# Patient Record
Sex: MALE | Race: BLACK OR AFRICAN AMERICAN | Employment: UNEMPLOYED | URBAN - METROPOLITAN AREA
[De-identification: names, ages, dates, MRNs, and addresses within clinical notes are randomized per-mention and may not be internally consistent; named-entity substitution may affect disease eponyms.]

---

## 2022-07-07 ENCOUNTER — HOSPITAL ENCOUNTER (EMERGENCY)
Age: 22
Discharge: HOME | End: 2022-07-07
Attending: EMERGENCY MEDICINE

## 2022-07-07 VITALS
DIASTOLIC BLOOD PRESSURE: 74 MMHG | TEMPERATURE: 97.5 F | HEART RATE: 61 BPM | HEIGHT: 71 IN | OXYGEN SATURATION: 100 % | WEIGHT: 169 LBS | BODY MASS INDEX: 23.66 KG/M2 | SYSTOLIC BLOOD PRESSURE: 141 MMHG | RESPIRATION RATE: 18 BRPM

## 2022-07-07 DIAGNOSIS — Z20.2 POSSIBLE EXPOSURE TO STD: Primary | ICD-10-CM

## 2022-07-07 DIAGNOSIS — N50.89 GENITAL LESION, MALE: ICD-10-CM

## 2022-07-07 LAB
APPEARANCE UR: CLEAR
BILIRUB UR QL: NEGATIVE
COLOR UR: YELLOW
GLUCOSE UR STRIP.AUTO-MCNC: NEGATIVE MG/DL
HGB UR QL STRIP: NEGATIVE
KETONES UR QL STRIP.AUTO: NEGATIVE MG/DL
LEUKOCYTE ESTERASE UR QL STRIP.AUTO: NEGATIVE
NITRITE UR QL STRIP.AUTO: NEGATIVE
PH UR STRIP: 7.5 [PH] (ref 5–8)
PROT UR STRIP-MCNC: NEGATIVE MG/DL
SP GR UR REFRACTOMETRY: 1.01 (ref 1–1.03)
UROBILINOGEN UR QL STRIP.AUTO: 0.2 EU/DL (ref 0.2–1)

## 2022-07-07 PROCEDURE — 87661 TRICHOMONAS VAGINALIS AMPLIF: CPT

## 2022-07-07 PROCEDURE — 87255 GENET VIRUS ISOLATE HSV: CPT

## 2022-07-07 PROCEDURE — 86780 TREPONEMA PALLIDUM: CPT

## 2022-07-07 PROCEDURE — 81003 URINALYSIS AUTO W/O SCOPE: CPT

## 2022-07-07 PROCEDURE — 87491 CHLMYD TRACH DNA AMP PROBE: CPT

## 2022-07-07 PROCEDURE — 99283 EMERGENCY DEPT VISIT LOW MDM: CPT

## 2022-07-07 RX ORDER — ACYCLOVIR 400 MG/1
400 TABLET ORAL
Qty: 35 TABLET | Refills: 0 | Status: SHIPPED | OUTPATIENT
Start: 2022-07-07 | End: 2022-07-14

## 2022-07-07 NOTE — LETTER
7/11/2022      [address] [address]        Dear Mr. Townsend Read,    You were recently seen in the Emergency Department of [facility] and had lab work performed. We would like to discuss these results with you. Please call the Emergency Department at your earliest convenience at [phone] between 10am-8pm to speak with one of our providers.     Sincerely,      Physician Emergency, MD      THE [facility] EMERGENCY DEPARTMENT  [address] [facility], [address] [place] [address] Road  [phone]

## 2022-07-07 NOTE — ED TRIAGE NOTES
Pt arrives ambulatory to ED with c\o possible STD exposure, pt has concern for herpes, pt sts he may have a lesion on his penis

## 2022-07-08 LAB
C TRACH RRNA SPEC QL NAA+PROBE: POSITIVE
N GONORRHOEA RRNA SPEC QL NAA+PROBE: NEGATIVE
SPECIMEN SOURCE: ABNORMAL
T PALLIDUM AB SER QL IA: NONREACTIVE

## 2022-07-08 RX ORDER — DOXYCYCLINE HYCLATE 100 MG
100 TABLET ORAL 2 TIMES DAILY
Qty: 14 TABLET | Refills: 0 | Status: SHIPPED | OUTPATIENT
Start: 2022-07-08 | End: 2022-07-15

## 2022-07-08 NOTE — CALL BACK NOTE
4:33 PM  2022    + chlamydia. Called patient. Confirmed . Discussed results and need for ABX therapy. Confirmed NKDA. Sent doxy to pharmacy in EMR per patient's request. Notify partners. 7 days for cure. FU for more complete STI testing.      Henry Townsend PA-C

## 2022-07-11 LAB
HSV SPEC CULT: ABNORMAL
SPECIMEN SOURCE: ABNORMAL

## 2022-07-11 NOTE — CALL BACK NOTE
HSV culture positive, attempted to contact patient as he may need antiviral or at least notified for follow up. No answer or ability to leave voicemail. Will send certified letter.

## 2023-02-13 ENCOUNTER — HOSPITAL ENCOUNTER (EMERGENCY)
Facility: HOSPITAL | Age: 23
Discharge: HOME | End: 2023-02-13
Attending: EMERGENCY MEDICINE

## 2023-02-13 VITALS
WEIGHT: 165 LBS | DIASTOLIC BLOOD PRESSURE: 80 MMHG | RESPIRATION RATE: 16 BRPM | HEART RATE: 77 BPM | SYSTOLIC BLOOD PRESSURE: 153 MMHG | OXYGEN SATURATION: 99 % | BODY MASS INDEX: 23.1 KG/M2 | TEMPERATURE: 97.5 F | HEIGHT: 71 IN

## 2023-02-13 DIAGNOSIS — Z20.2 STD EXPOSURE: Primary | ICD-10-CM

## 2023-02-13 LAB
APPEARANCE UR: CLEAR
BACTERIA URNS QL MICRO: NEGATIVE /HPF
BILIRUB UR QL: NEGATIVE
COLOR UR: YELLOW
EPITH CASTS URNS QL MICRO: NORMAL /LPF (ref 0–5)
GLUCOSE UR STRIP.AUTO-MCNC: NEGATIVE MG/DL
HGB UR QL STRIP: NEGATIVE
KETONES UR QL STRIP.AUTO: NEGATIVE MG/DL
LEUKOCYTE ESTERASE UR QL STRIP.AUTO: ABNORMAL
NITRITE UR QL STRIP.AUTO: NEGATIVE
PH UR STRIP: 8.5 (ref 5–8)
PROT UR STRIP-MCNC: NEGATIVE MG/DL
RBC #/AREA URNS HPF: NEGATIVE /HPF (ref 0–5)
SP GR UR REFRACTOMETRY: 1.02 (ref 1–1.03)
UROBILINOGEN UR QL STRIP.AUTO: 0.2 EU/DL (ref 0.2–1)
WBC URNS QL MICRO: NORMAL /HPF (ref 0–5)

## 2023-02-13 PROCEDURE — 87661 TRICHOMONAS VAGINALIS AMPLIF: CPT

## 2023-02-13 PROCEDURE — 81001 URINALYSIS AUTO W/SCOPE: CPT

## 2023-02-13 PROCEDURE — 87491 CHLMYD TRACH DNA AMP PROBE: CPT

## 2023-02-13 PROCEDURE — 99283 EMERGENCY DEPT VISIT LOW MDM: CPT

## 2023-02-13 NOTE — ED TRIAGE NOTES
Pt arrived with c/o exposure to STD. Pt denies any urinary symptoms, drainage or blisters/sores. Pt was told from a third party that a sexual partner had an STD. It is unconfirmed if that person is actively being treated for STDs. Pt is alert and oriented x4. Vital signs are stable.

## 2023-02-13 NOTE — Clinical Note
Loreta Gagnon was seen and treated in our emergency department on 2/13/2023. He may return to work on 02/14/2023.  ? If you have any questions or concerns, please don't hesitate to call.       Devante Chaves PA-C

## 2023-02-13 NOTE — ED PROVIDER NOTES
EMERGENCY DEPARTMENT HISTORY & PHYSICAL EXAM    THE [facility] EMERGENCY DEPT  2/13/2023, 2:08 PM    Clinical Impression:  1. STD exposure        Assessment/Differential Diagnosis:     Ddx STD exposure, UTI all considered    ED Course:   Initial assessment performed. The patients presenting problems have been discussed, and they are in agreement with the care plan formulated and outlined with them. I have encouraged them to ask questions as they arise throughout their visit. Pt here with possible STD exposure, no symptoms, requesting testing and treatment. UA with LE, STD testing pending. Rocephin given, will await further treatment   Discussed safe sexual practice  Return precautions given    Pt left department before receiving rocephin, eloped. Medical Chart Review:  I have reviewed triage nursing documentation. Review of old medical records with the following pertinent information:       Disposition:  Home  in good condition. Chief Complaint   Patient presents with    Exposure to STD     HPI:    The history is provided by patient. No  used. Wang Carbajal is a 23 y.o. male presenting to the Emergency Department with complaints of STD exposure. Patient was told by a friend that someone he recently had sex with has an STD. He has no other information. This concerned him so he came to the ED for evaluation and treatment. No prior STD. He denies any rash, lesions, penile pain, dysuria or penile discharge. No other concerns. He denies any chronic medical problems or medications      I have reviewed all PMHX, FMHX and Social Hx as entered into the medical record in the chart below using the Epic Template. Review of Systems:  Constitutional:  Neg for fever,  chills, weight changes.   ENT:  Neg for Sore throat, runny nose, or other URI symptoms  Respiratory:  neg for cough, no shortness of breath, or wheezing  Cardiovascular:  No chest pain, chest pressure, palpitations. GI:  no vomiting, no diarrhea, no abdominal pain. : no dysuria, no frequency, no urgency. no Flank pain. MSK no joint pain, no myalgias  Integumentary: no rashes, lesions, or skin irritation. Neurological: no headaches, dizziness, sensory or motor symptoms. All other systems reviewed negative except was is positive in ROS and HPI. Past Medical History:  No past medical history on file. Past Surgical History:  No past surgical history on file. Family History:  No family history on file. Social History: Allergies:  No Known Allergies    Vital Signs:  Vitals:    02/13/23 1129 02/13/23 1131   BP:  (!) 153/80   Pulse: 77    Resp: 16    Temp: 97.5 °F (36.4 °C)    TempSrc: Oral    SpO2: 99%    Weight: 165 lb (74.8 kg)    Height: 5' 11\" (1.803 m)      Physical Exam:  Vital Signs Reviewed. Nursing Notes Reviewed. Constitutional:  Well developed, well nourished patient. Appearance and behavior are age and situation appropriate. Head: Normocephalic, Atraumatic  Eyes: Conjunctiva clear, lids normal. Sclera anicteric. PERRL.  ENT:  gross hearing normal, throat normal   Lungs: Lungs CTAB. No wheezes, rales or rhonchi. No respiratory distress, tachypnea or accessory muscle use  Cardiac:  RRR without murmur. No peripheral edema  Abdomen: soft, normal BS, nontender, no mass  Extremities:  no pedal edema  Neuro:  A&O , no obvious neuro deficit with general inspection.   Skin:  Warm, dry, no rash  Back:  No CVAT    Diagnostics:    Labs -     Recent Results (from the past 12 hour(s))   Urinalysis    Collection Time: 02/13/23  1:31 PM   Result Value Ref Range    Color, UA YELLOW      Appearance CLEAR      Specific Gravity, UA 1.017 1.005 - 1.030      pH, Urine 8.5 (H) 5.0 - 8.0      Protein, UA Negative NEG mg/dL    Glucose, UA Negative NEG mg/dL    Ketones, Urine Negative NEG mg/dL    Bilirubin Urine Negative NEG      Blood, Urine Negative NEG      Urobilinogen, Urine 0.2 0.2 - 1.0 EU/dL    Nitrite, Urine Negative NEG      Leukocyte Esterase, Urine SMALL (A) NEG         Radiologic Studies -   No orders to display       Medications given in the ED-  Medications   cefTRIAXone (ROCEPHIN) 500 mg in lidocaine 1 % 1.4286 mL IM Injection (has no administration in time range)       Please note that this dictation was completed with [employer], the computer voice recognition software. Quite often unanticipated grammatical, syntax, homophones, and other interpretive errors are inadvertently transcribed by the computer software. Please disregard these errors. Please excuse any errors that have escaped final proofreading.        Panda Garcia PA-C  02/13/23 [address] [facility] MICHELLE Oseguera  02/13/23 1414

## 2023-02-13 NOTE — DISCHARGE INSTRUCTIONS
Your STD tests are pending. We will call if positive. You should refrain from sexual activity until your test results are known  If you test positive you should alert sexual partners as they will need to be tested and treated as well  Follow-up with the health department in 1 week for retesting to ensure her infections have cleared completely. Discussed with them HIV testing.   Return to ER if new or worsening symptoms or new concerns

## 2023-02-14 LAB
C TRACH RRNA SPEC QL NAA+PROBE: NEGATIVE
N GONORRHOEA RRNA SPEC QL NAA+PROBE: NEGATIVE
SPECIMEN SOURCE: NORMAL

## 2023-02-17 LAB
SPECIMEN SOURCE: NORMAL
T VAGINALIS RRNA SPEC QL NAA+PROBE: NEGATIVE

## 2023-09-12 NOTE — ED PROVIDER NOTES
EMERGENCY DEPARTMENT HISTORY AND PHYSICAL EXAM    Date: 7/7/2022  Patient Name: Landen Adorno    History of Presenting Illness     Chief Complaint   Patient presents with    Sexually Transmitted Disease         History Provided By: Patient    Chief Complaint: STI    HPI(Context):   2:49 PM  Landen Adorno is a 19 y.o. male who presents to the emergency department C/O possible STI. Associated sxs include lesion on head of penis. Pt concerned for HSV. No known exposure but he endorses new sexual partner. Pt denies drainage, dysuria, abdominal pain, back pain, and any other sxs or complaints. PCP: None        Past History     Past Medical History:  No past medical history on file. Past Surgical History:  No past surgical history on file. Family History:  No family history on file. Social History:  Social History     Tobacco Use    Smoking status: Not on file    Smokeless tobacco: Not on file   Substance Use Topics    Alcohol use: Not on file    Drug use: Not on file       Allergies:  No Known Allergies      Review of Systems   Review of Systems   Gastrointestinal: Negative for abdominal pain. Genitourinary: Positive for genital sores. Negative for dysuria, penile discharge, penile pain, penile swelling and scrotal swelling. Musculoskeletal: Negative for back pain. All other systems reviewed and are negative. Physical Exam     Vitals:    07/07/22 1338   BP: (!) 141/74   Pulse: 61   Resp: 18   Temp: 97.5 °F (36.4 °C)   SpO2: 100%   Weight: 76.7 kg (169 lb)   Height: 5' 11\" (1.803 m)     Physical Exam  Vitals and nursing note reviewed. Constitutional:       General: He is not in acute distress. Appearance: He is well-developed. He is not diaphoretic. Comments: AA male in NAD. Alert. Appears comfortable. HENT:      Head: Normocephalic and atraumatic.       Right Ear: External ear normal.      Left Ear: External ear normal.      Nose: Nose normal.   Eyes:      General: No scleral icterus. Right eye: No discharge. Left eye: No discharge. Conjunctiva/sclera: Conjunctivae normal.   Cardiovascular:      Rate and Rhythm: Normal rate and regular rhythm. Heart sounds: Normal heart sounds. No murmur heard. No friction rub. No gallop. Pulmonary:      Effort: Pulmonary effort is normal. No tachypnea, accessory muscle usage or respiratory distress. Breath sounds: Normal breath sounds. No decreased breath sounds, wheezing, rhonchi or rales. Genitourinary:     Penis: Circumcised. Lesions present. No erythema, tenderness, discharge or swelling. Testes: Normal.         Right: Tenderness or swelling not present. Left: Tenderness or swelling not present. Epididymis:      Right: Normal.      Left: Normal.       Musculoskeletal:         General: Normal range of motion. Cervical back: Normal range of motion and neck supple. Skin:     General: Skin is warm and dry. Neurological:      Mental Status: He is alert and oriented to person, place, and time. Psychiatric:         Judgment: Judgment normal.             Diagnostic Study Results     Labs -     Recent Results (from the past 12 hour(s))   URINALYSIS W/ RFLX MICROSCOPIC    Collection Time: 07/07/22  4:14 PM   Result Value Ref Range    Color YELLOW      Appearance CLEAR      Specific gravity 1.014 1.005 - 1.030      pH (UA) 7.5 5.0 - 8.0      Protein Negative NEG mg/dL    Glucose Negative NEG mg/dL    Ketone Negative NEG mg/dL    Bilirubin Negative NEG      Blood Negative NEG      Urobilinogen 0.2 0.2 - 1.0 EU/dL    Nitrites Negative NEG      Leukocyte Esterase Negative NEG         Radiologic Studies   No orders to display     CT Results  (Last 48 hours)    None        CXR Results  (Last 48 hours)    None          Medications given in the ED-  Medications - No data to display      Medical Decision Making   I am the first provider for this patient.     I reviewed the vital signs, available nursing notes, past medical history, past surgical history, family history and social history. Vital Signs-Reviewed the patient's vital signs. Pulse Oximetry Analysis - 100% on RA. NORMAL     Records Reviewed: Nursing Notes    Provider Notes (Medical Decision Making): HSV, HPV, syphilis, chancroid, benign pearly papules     Procedures:  Procedures    ED Course:   2:49 PM Initial assessment performed. The patients presenting problems have been discussed, and they are in agreement with the care plan formulated and outlined with them. I have encouraged them to ask questions as they arise throughout their visit. Diagnosis and Disposition       Will tx empirically for STI and await cultures and T Pallidium. Reasons to RTED discussed with pt. All questions answered. Pt feels comfortable going home at this time. Pt expressed understanding and he agrees with plan. 1. Possible exposure to STD    2. Genital lesion, male        PLAN:  1. D/C Home  2. Discharge Medication List as of 7/7/2022  5:02 PM        3. Follow-up Information     Follow up With Specialties Details [facility] [address]    [address] [place] [address]  [phone]    [facility] EMERGENCY DEPT Emergency Medicine   [address] [address] [place] [address]  [phone]        _______________________________    Attestations: This note is prepared by Armando Crystal PA-C.  _______________________________          Please note that this dictation was completed with [employer], the computer voice recognition software. Quite often unanticipated grammatical, syntax, homophones, and other interpretive errors are inadvertently transcribed by the computer software. Please disregard these errors. Please excuse any errors that have escaped final proofreading. No

## 2024-02-15 ENCOUNTER — HOSPITAL ENCOUNTER (EMERGENCY)
Facility: HOSPITAL | Age: 24
Discharge: HOME | End: 2024-02-15
Payer: OTHER GOVERNMENT

## 2024-02-15 VITALS
RESPIRATION RATE: 14 BRPM | OXYGEN SATURATION: 99 % | BODY MASS INDEX: 23.1 KG/M2 | HEIGHT: 71 IN | WEIGHT: 165 LBS | DIASTOLIC BLOOD PRESSURE: 82 MMHG | HEART RATE: 64 BPM | TEMPERATURE: 98.7 F | SYSTOLIC BLOOD PRESSURE: 140 MMHG

## 2024-02-15 DIAGNOSIS — R09.81 NASAL CONGESTION: ICD-10-CM

## 2024-02-15 DIAGNOSIS — U07.1 COVID: Primary | ICD-10-CM

## 2024-02-15 LAB

## 2024-02-15 PROCEDURE — 0202U NFCT DS 22 TRGT SARS-COV-2: CPT

## 2024-02-15 PROCEDURE — 99283 EMERGENCY DEPT VISIT LOW MDM: CPT

## 2024-02-15 NOTE — DISCHARGE INSTRUCTIONS
Must wear mask for full 10 days.  After day 6 may return to work.  May take Tylenol Motrin as needed for body aches or fevers.  Push fluids, use a vaporizer, use Tylenol or OTC NSAID's (Advil, Alleve etc) for fever or achiness, OTC cough suppressant/decongestants such as Robitussin and rest.

## 2024-02-15 NOTE — ED PROVIDER NOTES
EMERGENCY DEPARTMENT HISTORY AND PHYSICAL EXAM    3:35 AM      Date: 2/15/2024  Patient Name: Ernie Khan    History of Presenting Illness     Chief Complaint   Patient presents with    Cough         History Provided By: Patient and medical chart review.    Additional History (Context): Ernie Khan is a 23 y.o. male with No past medical history on file. who presents with complaints of cough runny nose and sneezing.  States it started last week Friday.  States he felt worst at first but states he is feeling better just needs a note to return to work.  Patient also is being seen with partner who has similar symptoms, that started yesterday.  Patient denies any nausea vomiting diarrhea.  Denies any fevers.  Denies any chest pain or shortness of breath.    PCP: None, None    No current facility-administered medications for this encounter.     No current outpatient medications on file.       Past History     Past Medical History:  No past medical history on file.    Past Surgical History:  No past surgical history on file.    Family History:  No family history on file.    Social History:          Allergies:  No Known Allergies    PMH, PSH, family history, social history, allergies reviewed with the patient with significant items noted above.    Review of Systems       Review of Systems   Constitutional:  Negative for fever.   HENT:  Positive for rhinorrhea and sneezing.    Respiratory:  Positive for cough.    Gastrointestinal: Negative.        Physical Exam   BP (!) 140/82   Pulse 64   Temp 98.7 °F (37.1 °C) (Oral)   Resp 14   Ht 1.803 m (5' 11\")   Wt 74.8 kg (165 lb)   SpO2 99%   BMI 23.01 kg/m²       Physical Exam  Vitals and nursing note reviewed.   Constitutional:       General: He is not in acute distress.     Appearance: Normal appearance. He is not ill-appearing, toxic-appearing or diaphoretic.   HENT:      Head: Normocephalic and atraumatic.   Cardiovascular:      Rate and Rhythm: Normal rate.

## 2024-02-15 NOTE — ED NOTES
Patient is discharged at this time , patient has been given discharge instructions. D/C paperwork and been explained and questions have been answered at this time.

## 2024-04-02 ENCOUNTER — HOSPITAL ENCOUNTER (EMERGENCY)
Facility: HOSPITAL | Age: 24
Discharge: HOME | End: 2024-04-02
Attending: EMERGENCY MEDICINE
Payer: OTHER GOVERNMENT

## 2024-04-02 VITALS
HEIGHT: 71 IN | BODY MASS INDEX: 23.1 KG/M2 | HEART RATE: 56 BPM | DIASTOLIC BLOOD PRESSURE: 97 MMHG | TEMPERATURE: 97.9 F | RESPIRATION RATE: 18 BRPM | SYSTOLIC BLOOD PRESSURE: 136 MMHG | OXYGEN SATURATION: 100 % | WEIGHT: 165 LBS

## 2024-04-02 DIAGNOSIS — K20.90 ESOPHAGITIS: Primary | ICD-10-CM

## 2024-04-02 PROCEDURE — 99283 EMERGENCY DEPT VISIT LOW MDM: CPT

## 2024-04-02 PROCEDURE — 6370000000 HC RX 637 (ALT 250 FOR IP): Performed by: EMERGENCY MEDICINE

## 2024-04-02 RX ORDER — SUCRALFATE ORAL 1 G/10ML
1 SUSPENSION ORAL 4 TIMES DAILY
Qty: 1200 ML | Refills: 0 | Status: SHIPPED | OUTPATIENT
Start: 2024-04-02

## 2024-04-02 RX ADMIN — LIDOCAINE HYDROCHLORIDE 40 ML: 20 SOLUTION ORAL at 03:21

## 2024-04-02 ASSESSMENT — PAIN - FUNCTIONAL ASSESSMENT: PAIN_FUNCTIONAL_ASSESSMENT: 0-10

## 2024-04-02 ASSESSMENT — LIFESTYLE VARIABLES
HOW MANY STANDARD DRINKS CONTAINING ALCOHOL DO YOU HAVE ON A TYPICAL DAY: 3 OR 4
HOW OFTEN DO YOU HAVE A DRINK CONTAINING ALCOHOL: 2-3 TIMES A WEEK

## 2024-04-02 ASSESSMENT — PAIN SCALES - GENERAL: PAINLEVEL_OUTOF10: 7

## 2024-04-02 NOTE — ED PROVIDER NOTES
EMERGENCY DEPARTMENT HISTORY AND PHYSICAL EXAM      Patient Name: Ernie Khan  MRN: [id number]  YOB: 2000  Provider: Pam Ruano MD  PCP: None, None   Time/Date of evaluation: 4:46 AM EDT on 4/2/24    History of Presenting Illness     Chief Complaint   Patient presents with    OTHER       History Provided By: Patient     History (Narative):   Ernie Khan is a 23 y.o. male with a PMHX of esophagitis  who presents to the emergency department  by POV C/O esophageal pain and burning.  He was seen for this in the past and was diagnosed with esophagitis.  He denies any heavy alcohol drinking, caffeine, sodas, or NSAIDs.  He states that he does smoke.  He does not have any nausea, vomiting, or abdominal pain.        Past History     Past Medical History:  History reviewed. No pertinent past medical history.    Past Surgical History:  History reviewed. No pertinent surgical history.    Family History:  History reviewed. No pertinent family history.    Social History:       Medications:  No current facility-administered medications for this encounter.     Current Outpatient Medications   Medication Sig Dispense Refill    sucralfate (CARAFATE) 1 GM/10ML suspension Take 10 mLs by mouth 4 times daily 1200 mL 0       Allergies:  No Known Allergies    Social Determinants of Health:  Social Determinants of Health     Tobacco Use: Not on file   Alcohol Use: Heavy Drinker (4/2/2024)    AUDIT-C     Frequency of Alcohol Consumption: 2-3 times a week     Average Number of Drinks: 3 or 4     Frequency of Binge Drinking: Less than monthly   Financial Resource Strain: Not on file   Food Insecurity: Not on file   Transportation Needs: Not on file   Physical Activity: Not on file   Stress: Not on file   Social Connections: Not on file   Intimate Partner Violence: Not on file   Depression: Not on file   Housing Stability: Not on file   Interpersonal Safety: Not on file   Utilities: Not on file       Review of Systems

## 2024-04-02 NOTE — ED TRIAGE NOTES
Pt thinks he is having a flare up of inflammed esophogus.  States it feels like his throat is trying to come out of his mouth